# Patient Record
Sex: MALE | Race: WHITE | NOT HISPANIC OR LATINO | Employment: FULL TIME | URBAN - NONMETROPOLITAN AREA
[De-identification: names, ages, dates, MRNs, and addresses within clinical notes are randomized per-mention and may not be internally consistent; named-entity substitution may affect disease eponyms.]

---

## 2024-02-01 ENCOUNTER — APPOINTMENT (OUTPATIENT)
Dept: GENERAL RADIOLOGY | Facility: HOSPITAL | Age: 19
End: 2024-02-01
Payer: COMMERCIAL

## 2024-02-01 ENCOUNTER — APPOINTMENT (OUTPATIENT)
Dept: CT IMAGING | Facility: HOSPITAL | Age: 19
End: 2024-02-01
Payer: COMMERCIAL

## 2024-02-01 ENCOUNTER — HOSPITAL ENCOUNTER (EMERGENCY)
Facility: HOSPITAL | Age: 19
Discharge: HOME | End: 2024-02-01
Attending: EMERGENCY MEDICINE | Admitting: EMERGENCY MEDICINE
Payer: COMMERCIAL

## 2024-02-01 VITALS
WEIGHT: 195 LBS | HEIGHT: 75 IN | DIASTOLIC BLOOD PRESSURE: 87 MMHG | OXYGEN SATURATION: 96 % | BODY MASS INDEX: 24.25 KG/M2 | HEART RATE: 102 BPM | TEMPERATURE: 97.8 F | RESPIRATION RATE: 17 BRPM | SYSTOLIC BLOOD PRESSURE: 125 MMHG

## 2024-02-01 DIAGNOSIS — M79.18 MUSCULOSKELETAL PAIN: ICD-10-CM
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
## 2024-02-01 PROCEDURE — 73030 X-RAY EXAM OF SHOULDER: CPT | Performed by: RADIOLOGY

## 2024-02-01 PROCEDURE — 99284 EMERGENCY DEPT VISIT MOD MDM: CPT

## 2024-02-01 PROCEDURE — 73000 X-RAY EXAM OF COLLAR BONE: CPT

## 2024-02-01 PROCEDURE — 73030 X-RAY EXAM OF SHOULDER: CPT

## 2024-02-01 PROCEDURE — 73000 X-RAY EXAM OF COLLAR BONE: CPT | Performed by: RADIOLOGY

## 2024-02-01 PROCEDURE — 72125 CT NECK SPINE W/O DYE: CPT | Performed by: RADIOLOGY

## 2024-02-01 PROCEDURE — 72125 CT NECK SPINE W/O DYE: CPT

## 2024-02-01 RX ORDER — IBUPROFEN 800 MG/1
800 TABLET ORAL
Qty: 30 TABLET | Refills: 0 | Status: SHIPPED | OUTPATIENT
Start: 2024-02-01

## 2024-02-01 NOTE — DISCHARGE INSTRUCTIONS
Call one of the offices below to establish a primary care provider.  If you are unable to get an appointment and feel it is an emergency and need to be seen immediately please return to the Emergency Department.    Call one of the office below to set up a primary care provider.    Dr. Cooper Menjivar                                                                                                       [address] [facility] [address]  [phone]    Dr. Anderson, Dr. ÓSCAR Little, Dr. FAZAL Little ([facility])  [address] [facility] [address]  [phone]    Dr. Dunne, Dr. Dsouza, Dr. Rodriguez ([facility])  [address] [facility] [address]  [phone]    Dr. Woodson  [address] [facility] [address]  [phone]    Dr. Baumann, Dr. Tran, Dr. Ayala, Dr. Dillard ([facility])  [address] [place] [address] [address]  [facility] [address]  [phone]    Dr. Sara Redmond  [address] [facility] [place] [address]  [phone]    Dr. Juliana Dee  [address]   [address]  [facility] [address]  [phone]    Dr. Menjivar  [address] [facility] [address]  [phone]    Dr. Goodrich, Dr. Steiner  [address] [facility] [place] [address]  [phone]    Dr. Nolan  [address]                                                              [address]  [facility] [address]  [phone]    Dr. Gallardo  [address] [facility] [address]  [phone]    Dr. Teresa Mcpherson  [address]  [address]  [place] [address]  [phone]    Dr. Cabrales and [facility]   [address] [facility]  [address]  [place] [address]  [phone]
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
